# Patient Record
Sex: MALE | Race: BLACK OR AFRICAN AMERICAN | HISPANIC OR LATINO | URBAN - METROPOLITAN AREA
[De-identification: names, ages, dates, MRNs, and addresses within clinical notes are randomized per-mention and may not be internally consistent; named-entity substitution may affect disease eponyms.]

---

## 2024-06-17 ENCOUNTER — EMERGENCY (EMERGENCY)
Facility: HOSPITAL | Age: 24
LOS: 0 days | Discharge: HOME | End: 2024-06-17
Attending: EMERGENCY MEDICINE
Payer: COMMERCIAL

## 2024-06-17 VITALS
DIASTOLIC BLOOD PRESSURE: 53 MMHG | SYSTOLIC BLOOD PRESSURE: 101 MMHG | OXYGEN SATURATION: 100 % | HEART RATE: 78 BPM | RESPIRATION RATE: 17 BRPM | TEMPERATURE: 98 F | WEIGHT: 265 LBS

## 2024-06-17 VITALS
HEART RATE: 85 BPM | SYSTOLIC BLOOD PRESSURE: 111 MMHG | OXYGEN SATURATION: 99 % | TEMPERATURE: 98 F | DIASTOLIC BLOOD PRESSURE: 72 MMHG | RESPIRATION RATE: 18 BRPM

## 2024-06-17 DIAGNOSIS — S09.90XA UNSPECIFIED INJURY OF HEAD, INITIAL ENCOUNTER: ICD-10-CM

## 2024-06-17 DIAGNOSIS — S05.8X2A OTHER INJURIES OF LEFT EYE AND ORBIT, INITIAL ENCOUNTER: ICD-10-CM

## 2024-06-17 DIAGNOSIS — Z91.013 ALLERGY TO SEAFOOD: ICD-10-CM

## 2024-06-17 DIAGNOSIS — Z23 ENCOUNTER FOR IMMUNIZATION: ICD-10-CM

## 2024-06-17 DIAGNOSIS — S02.32XA FRACTURE OF ORBITAL FLOOR, LEFT SIDE, INITIAL ENCOUNTER FOR CLOSED FRACTURE: ICD-10-CM
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
## 2024-06-17 DIAGNOSIS — R51.9 HEADACHE, UNSPECIFIED: ICD-10-CM
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
## 2024-06-17 DIAGNOSIS — S01.112A LACERATION WITHOUT FOREIGN BODY OF LEFT EYELID AND PERIOCULAR AREA, INITIAL ENCOUNTER: ICD-10-CM

## 2024-06-17 LAB
ALBUMIN SERPL ELPH-MCNC: 4.6 G/DL — SIGNIFICANT CHANGE UP (ref 3.5–5.2)
ALP SERPL-CCNC: 59 U/L — SIGNIFICANT CHANGE UP (ref 30–115)
ALT FLD-CCNC: 30 U/L — SIGNIFICANT CHANGE UP (ref 0–41)
ANION GAP SERPL CALC-SCNC: 12 MMOL/L — SIGNIFICANT CHANGE UP (ref 7–14)
APTT BLD: 28.9 SEC — SIGNIFICANT CHANGE UP (ref 27–39.2)
AST SERPL-CCNC: 39 U/L — SIGNIFICANT CHANGE UP (ref 0–41)
BASOPHILS # BLD AUTO: 0.05 K/UL — SIGNIFICANT CHANGE UP (ref 0–0.2)
BASOPHILS NFR BLD AUTO: 0.5 % — SIGNIFICANT CHANGE UP (ref 0–1)
BILIRUB SERPL-MCNC: 0.4 MG/DL — SIGNIFICANT CHANGE UP (ref 0.2–1.2)
BLD GP AB SCN SERPL QL: SIGNIFICANT CHANGE UP
BUN SERPL-MCNC: 8 MG/DL — LOW (ref 10–20)
CALCIUM SERPL-MCNC: 9.2 MG/DL — SIGNIFICANT CHANGE UP (ref 8.4–10.5)
CHLORIDE SERPL-SCNC: 104 MMOL/L — SIGNIFICANT CHANGE UP (ref 98–110)
CO2 SERPL-SCNC: 23 MMOL/L — SIGNIFICANT CHANGE UP (ref 17–32)
CREAT SERPL-MCNC: 0.8 MG/DL — SIGNIFICANT CHANGE UP (ref 0.7–1.5)
EGFR: 128 ML/MIN/1.73M2 — SIGNIFICANT CHANGE UP
EOSINOPHIL # BLD AUTO: 0.02 K/UL — SIGNIFICANT CHANGE UP (ref 0–0.7)
EOSINOPHIL NFR BLD AUTO: 0.2 % — SIGNIFICANT CHANGE UP (ref 0–8)
ETHANOL SERPL-MCNC: 231 MG/DL — HIGH
GLUCOSE SERPL-MCNC: 127 MG/DL — HIGH (ref 70–99)
HCT VFR BLD CALC: 47.4 % — SIGNIFICANT CHANGE UP (ref 42–52)
HGB BLD-MCNC: 16.2 G/DL — SIGNIFICANT CHANGE UP (ref 14–18)
IMM GRANULOCYTES NFR BLD AUTO: 0.4 % — HIGH (ref 0.1–0.3)
INR BLD: 1.11 RATIO — SIGNIFICANT CHANGE UP (ref 0.65–1.3)
LACTATE SERPL-SCNC: 2 MMOL/L — SIGNIFICANT CHANGE UP (ref 0.7–2)
LIDOCAIN IGE QN: 21 U/L — SIGNIFICANT CHANGE UP (ref 7–60)
LYMPHOCYTES # BLD AUTO: 2.32 K/UL — SIGNIFICANT CHANGE UP (ref 1.2–3.4)
LYMPHOCYTES # BLD AUTO: 24.6 % — SIGNIFICANT CHANGE UP (ref 20.5–51.1)
MCHC RBC-ENTMCNC: 30.7 PG — SIGNIFICANT CHANGE UP (ref 27–31)
MCHC RBC-ENTMCNC: 34.2 G/DL — SIGNIFICANT CHANGE UP (ref 32–37)
MCV RBC AUTO: 89.8 FL — SIGNIFICANT CHANGE UP (ref 80–94)
MONOCYTES # BLD AUTO: 0.67 K/UL — HIGH (ref 0.1–0.6)
MONOCYTES NFR BLD AUTO: 7.1 % — SIGNIFICANT CHANGE UP (ref 1.7–9.3)
NEUTROPHILS # BLD AUTO: 6.35 K/UL — SIGNIFICANT CHANGE UP (ref 1.4–6.5)
NEUTROPHILS NFR BLD AUTO: 67.2 % — SIGNIFICANT CHANGE UP (ref 42.2–75.2)
NRBC # BLD: 0 /100 WBCS — SIGNIFICANT CHANGE UP (ref 0–0)
PLATELET # BLD AUTO: 286 K/UL — SIGNIFICANT CHANGE UP (ref 130–400)
PMV BLD: 11.4 FL — HIGH (ref 7.4–10.4)
POTASSIUM SERPL-MCNC: 5.7 MMOL/L — HIGH (ref 3.5–5)
POTASSIUM SERPL-SCNC: 5.7 MMOL/L — HIGH (ref 3.5–5)
PROT SERPL-MCNC: 8.1 G/DL — HIGH (ref 6–8)
PROTHROM AB SERPL-ACNC: 12.7 SEC — SIGNIFICANT CHANGE UP (ref 9.95–12.87)
RBC # BLD: 5.28 M/UL — SIGNIFICANT CHANGE UP (ref 4.7–6.1)
RBC # FLD: 11.9 % — SIGNIFICANT CHANGE UP (ref 11.5–14.5)
SODIUM SERPL-SCNC: 139 MMOL/L — SIGNIFICANT CHANGE UP (ref 135–146)
WBC # BLD: 9.45 K/UL — SIGNIFICANT CHANGE UP (ref 4.8–10.8)
WBC # FLD AUTO: 9.45 K/UL — SIGNIFICANT CHANGE UP (ref 4.8–10.8)

## 2024-06-17 PROCEDURE — 71045 X-RAY EXAM CHEST 1 VIEW: CPT | Mod: 26

## 2024-06-17 PROCEDURE — 70486 CT MAXILLOFACIAL W/O DYE: CPT | Mod: 26,MC

## 2024-06-17 PROCEDURE — 71250 CT THORAX DX C-: CPT | Mod: 26,MC

## 2024-06-17 PROCEDURE — 99283 EMERGENCY DEPT VISIT LOW MDM: CPT

## 2024-06-17 PROCEDURE — 93005 ELECTROCARDIOGRAM TRACING: CPT

## 2024-06-17 PROCEDURE — 82962 GLUCOSE BLOOD TEST: CPT

## 2024-06-17 PROCEDURE — 96375 TX/PRO/DX INJ NEW DRUG ADDON: CPT

## 2024-06-17 PROCEDURE — 99285 EMERGENCY DEPT VISIT HI MDM: CPT

## 2024-06-17 PROCEDURE — 74176 CT ABD & PELVIS W/O CONTRAST: CPT | Mod: 26,MC

## 2024-06-17 PROCEDURE — 70450 CT HEAD/BRAIN W/O DYE: CPT | Mod: 26,MC

## 2024-06-17 PROCEDURE — 72125 CT NECK SPINE W/O DYE: CPT | Mod: 26,MC

## 2024-06-17 PROCEDURE — 99285 EMERGENCY DEPT VISIT HI MDM: CPT | Mod: 25

## 2024-06-17 PROCEDURE — 90471 IMMUNIZATION ADMIN: CPT

## 2024-06-17 PROCEDURE — 93010 ELECTROCARDIOGRAM REPORT: CPT

## 2024-06-17 PROCEDURE — 99284 EMERGENCY DEPT VISIT MOD MDM: CPT | Mod: 24,25

## 2024-06-17 PROCEDURE — 72170 X-RAY EXAM OF PELVIS: CPT | Mod: 26

## 2024-06-17 PROCEDURE — 96374 THER/PROPH/DIAG INJ IV PUSH: CPT

## 2024-06-17 RX ORDER — FLUORESCEIN SODIUM 9 MG
1 STRIP OPHTHALMIC (EYE) ONCE
Refills: 0 | Status: COMPLETED | OUTPATIENT
Start: 2024-06-17 | End: 2024-06-17

## 2024-06-17 RX ORDER — ONDANSETRON 8 MG/1
4 TABLET, FILM COATED ORAL ONCE
Refills: 0 | Status: COMPLETED | OUTPATIENT
Start: 2024-06-17 | End: 2024-06-17

## 2024-06-17 RX ORDER — TETANUS TOXOID, REDUCED DIPHTHERIA TOXOID AND ACELLULAR PERTUSSIS VACCINE, ADSORBED 5; 2.5; 8; 8; 2.5 [IU]/.5ML; [IU]/.5ML; UG/.5ML; UG/.5ML; UG/.5ML
0.5 SUSPENSION INTRAMUSCULAR ONCE
Refills: 0 | Status: COMPLETED | OUTPATIENT
Start: 2024-06-17 | End: 2024-06-17

## 2024-06-17 RX ORDER — AMPICILLIN SODIUM AND SULBACTAM SODIUM 250; 125 MG/ML; MG/ML
3 INJECTION, POWDER, FOR SUSPENSION INTRAMUSCULAR; INTRAVENOUS ONCE
Refills: 0 | Status: COMPLETED | OUTPATIENT
Start: 2024-06-17 | End: 2024-06-17

## 2024-06-17 RX ORDER — OFLOXACIN 0.3 %
1 DROPS OPHTHALMIC (EYE) ONCE
Refills: 0 | Status: COMPLETED | OUTPATIENT
Start: 2024-06-17 | End: 2024-06-17

## 2024-06-17 RX ORDER — MORPHINE SULFATE 50 MG/1
4 CAPSULE, EXTENDED RELEASE ORAL ONCE
Refills: 0 | Status: DISCONTINUED | OUTPATIENT
Start: 2024-06-17 | End: 2024-06-17

## 2024-06-17 RX ADMIN — TETANUS TOXOID, REDUCED DIPHTHERIA TOXOID AND ACELLULAR PERTUSSIS VACCINE, ADSORBED 0.5 MILLILITER(S): 5; 2.5; 8; 8; 2.5 SUSPENSION INTRAMUSCULAR at 07:55

## 2024-06-17 RX ADMIN — MORPHINE SULFATE 4 MILLIGRAM(S): 50 CAPSULE, EXTENDED RELEASE ORAL at 15:34

## 2024-06-17 RX ADMIN — ONDANSETRON 4 MILLIGRAM(S): 8 TABLET, FILM COATED ORAL at 07:55

## 2024-06-17 RX ADMIN — AMPICILLIN SODIUM AND SULBACTAM SODIUM 200 GRAM(S): 250; 125 INJECTION, POWDER, FOR SUSPENSION INTRAMUSCULAR; INTRAVENOUS at 15:34

## 2024-06-17 NOTE — ED PROVIDER NOTE - PATIENT PORTAL LINK FT
You can access the FollowMyHealth Patient Portal offered by [facility] by registering at the following website: [email or web address]. By joining [facility]’s FollowMyHealth portal, you will also be able to view your health information using other applications (apps) compatible with our system.

## 2024-06-17 NOTE — ED ADULT NURSE REASSESSMENT NOTE - NS ED NURSE REASSESS COMMENT FT1
Pt received as [place] transfer, pt in C-collar w/ 18g in place to R AC. Pt sleeping but easily arousable. Trauma alert called- see flowsheet
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
## 2024-06-17 NOTE — ED PROVIDER NOTE - PHYSICAL EXAMINATION
VITAL SIGNS: I have reviewed nursing notes and confirm.  CONSTITUTIONAL: 22 yo M laying on stretcher in NAD; (+) AOB  SKIN: Skin exam is warm and dry, no acute rash.  HEAD: Normocephalic; (+) L frontal abrasion, L maxillary abrasion  EYES: PERRL, EOM intact; conjunctiva and sclera clear. (+) L periorbital ecchymosis, skin tear to L upper eyelid, 0.5 cm laceration to L upper eyelid.  ENT: No nasal discharge; airway clear.   NECK: Supple; No midline TTP.   CARD: S1, S2 normal; no murmurs, gallops, or rubs. Regular rate and rhythm.  RESP: No wheezes, rales or rhonchi.   ABD: Normal bowel sounds; soft; non-distended; non-tender; No rebound or guarding. No CVA tenderness.  BACK: No midline TTP.   EXT: No hip TTP or deformity. Radial and DP 2+.   NEURO: Alert, moving all extremities, no focal deficits.
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
## 2024-06-17 NOTE — ED ADULT NURSE REASSESSMENT NOTE - NS ED NURSE REASSESS COMMENT FT1
RN attempted to ambulate. Pt ambulatory w steady gait by self. Spouse at bedside encouraging patient

## 2024-06-17 NOTE — ED ADULT NURSE REASSESSMENT NOTE - NS ED NURSE REASSESS COMMENT FT1
pt brought to ct on cardiac monitor, while in ct scan pt began projectile vomiting blood. concern for airway management, rapid response activated. pt remained stable through episode and brought back to ED with ED staff at bedside.
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
## 2024-06-17 NOTE — ED PROVIDER NOTE - NSFOLLOWUPINSTRUCTIONS_ED_ALL_ED_FT
Our Emergency Department Referral Coordinators will be reaching out to you in the next 24-48 hours from 9:00am to 5:00pm with a follow up appointment. Please expect a phone call from the hospital in that time frame. If you do not receive a call or if you have any questions or concerns, you can reach them at   [phone]    Please followup with Ophthalmology and OMFS    Orbital Fracture  Front, or anterior, view of the skull inside a person's head showing the orbital roof and orbital floor.  An orbital fracture is a break in the orbit or eye socket, which is the bony structure that protects the eye. This fracture usually causes swelling and pain, and it may affect vision.    There are three main types of orbital fracture:  Orbital roof fracture. This is a break at the top part of the orbit.  Orbital rim fracture. This is a break at the outer edge of the orbit.  Orbital floor fracture. This is a break at the bottom part of the orbit.  An orbital floor fracture with entrapment is when muscle or tissue, or both, get trapped inside of an orbital fracture. Vision is often affected. Orbital floor fracture with entrapment may also be called a trapdoor fracture. This fracture is more commonly seen in children and may cause a nervous system reaction that requires treatment right away because of irregular pulse rates and blood pressure levels.    What are the causes?  This condition is caused by an injury to your eye, such as a hard, direct hit. Causes include:  Sports injuries, such as a hard compact ball or bat striking the eye.  Falls during running or bicycling.  Assaults or combat sports where trauma to the face occurs.  Motor vehicle accidents in which the face hits the dashboard or another hard object inside the vehicle.  What are the signs or symptoms?  Two eyes. One eye is normal. The other eye has swelling and bruising around it.  Symptoms of this condition include:  Swelling and bruising around your eye.  Pain or tenderness around your eye.  Difficulty looking up, down, or side to side.  Changes in vision, such as blurry vision or double vision.  The injured eye looking sunken compared with the other eye (enophthalmos).  The injured eye bulging (exophthalmos), in severe cases of swelling.  Inability to gaze upwards with the affected eye.  Numbness of the cheek, inner nose, and upper gum on the side of the face that has the injury.  How is this diagnosed?  This condition may be diagnosed based on:  Your symptoms and medical history, especially any history of trauma.  An eye exam. This involves checking visual clarity (acuity), feeling the orbital area with the hand, and checking other structures of the face.  An X-ray or a CT scan.  How is this treated?  Treatment for this condition depends on the severity and type of fracture.    For small fractures, surgery may not be needed. The broken bone heals on its own with time. To help manage symptoms, treatment may include:  Applying ice to the injured area.  Pain medicines.  Antibiotic medicine to treat or prevent infection.  Steroids to reduce swelling.  Scheduling a follow-up visit with an eye specialist (ophthalmologist).  If there is entrapment, treatment is usually started after all swelling around the eye has gone away, which may take 1–2 weeks. After swelling goes away, an ophthalmologist may try to free the entrapped tissue if you still have double vision. If this cannot be done, you may need surgery.    If you have double vision only when looking up, your health care provider will discuss treatment options with you. Some people who do not spend a lot of time looking up choose to go without more treatment. Others who need to look up often for work need treatment.    If you have any of these symptoms, you may need emergency surgery:  Nausea or vomiting.  A slow heart rate.  Dizziness.  Loss of consciousness.  Severe pain or worsening bulging of the eye.  Complete loss of vision in the injured eye. This may indicate optic nerve damage.  Follow these instructions at home:  Medicines    Take over-the-counter and prescription medicines only as told by your health care provider.  If you were prescribed an antibiotic medicine, take it as told by your health care provider. Do not stop taking the antibiotic even if you start to feel better.  Managing pain, bruising, and swelling    A bag of ice on a towel on the skin.   If directed, put ice on the injured eye. To do this:  Put ice in a plastic bag.  Place a towel between your skin and the bag.  Leave the ice on for 20 minutes, 2–3 times a day.  Remove the ice if your skin turns bright red. This is very important. If you cannot feel pain, heat, or cold, you have a greater risk of damage to the area.  If recommended by your health care provider, put one or two extra pillows under your head when you are lying down.  Activity    Do not drive or operate machinery until your health care provider says that it is safe. Be aware that if you are using only one eye to see, you may have trouble judging distances (depth perception). Ask your health care provider when it is safe to drive.  Avoid traveling by plane or going to high-altitude areas. This may slow the healing of your swelling and may increase sinus pain.  Do not lift anything that is heavier than 10 lb (4.5 kg), or the limit that you are told, until your health care provider says that it is safe.  Return to your normal activities as told by your health care provider. Ask your health care provider what activities are safe for you.  Follow instructions from your health care provider about wearing protective glasses or goggles for work.  General instructions    Do not touch, rub, or try to move your eye.  Do not blow your nose until your health care provider says that you can.  Do not put a contact lens in the injured eye until your health care provider says that you can.  Stay away from khloe areas.  Do not use any products that contain nicotine or tobacco. These products include cigarettes, chewing tobacco, and vaping devices, such as e-cigarettes. If you need help quitting, ask your health care provider.  Do not take baths, swim, or use a hot tub until your health care provider approves. Ask your health care provider if you may take showers. You may only be allowed to take sponge baths.  Keep all follow-up visits. This is important.  Contact a health care provider if:  You have vision changes, such as increased blurriness or worsening double vision.  You feel pain when you move your eyes.  You feel nauseous or light-headed when you move your eyes.  You have redness or swelling that does not go away or gets worse.  You have blood or fluid coming from your nose.  You have a fever or a headache.  Get help right away if:  You have a sensation of seeing flashing lights.  You have sudden blindness.  You have sudden bulging of the injured eye.  Your heart is beating much more slowly than normal.  You feel dizzy or you faint.  You have chest pain.  You are short of breath.  These symptoms may represent a serious problem that is an emergency. Do not wait to see if the symptoms will go away. Get medical help right away. Call your local emergency services (911 in the [place] Do not drive yourself to the hospital.    Summary  An orbital fracture is a break in the orbit or eye socket, which is the bony structure that protects the eye.  This condition usually causes swelling and pain around the injured eye, and you may have bruising or vision loss. You may also have trouble looking up, down, or side to side.  Treatment depends on the severity and type of fracture and if there are any serious vision problems. Often, the only treatment is waiting until the swelling goes down. More serious symptoms may indicate the need for emergency surgery.  You should notdrive until your health care provider says that it is safe. Return to your normal activities as told by your health care provider. It is important to keep all follow-up visits.  This information is not intended to replace advice given to you by your health care provider. Make sure you discuss any questions you have with your health care provider.
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
## 2024-06-17 NOTE — CONSULT NOTE ADULT - ASSESSMENT
ASSESSMENT  1) Left Orbital Blowout Fracture   -- no evidence of entrapment   2) Commotio Retinae OS  3) Lid lacerations/abrasions OS    RECOMMENDATIONS  -- Treat lacerations as directed by Plastics team (Abx wale)  -- No nose blowing x 1 week, pt should sneeze with mouth open  -- Continue icing of affected area x 20 min q1-2 hours   -- Sleep with head elevated ~30 degrees  -- Pt should follow up as outpatient at [facility] ([phone]) or Eye Care Provider of pt's choice within 2-4 weeks AFTER Plastics appointment

## 2024-06-17 NOTE — ED ADULT NURSE NOTE - NS TRANSFER PATIENT BELONGINGS
sneakers, jewelry and clothing given to wife, paulie, in pt belonging bag/Jewelry/Money (specify)/Clothing
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
## 2024-06-17 NOTE — ED PROVIDER NOTE - STUDIES
Xray Image(s) - see wet read section for interpretation/CT Scan images EKG - see results section for interpretation/Xray Image(s) - see wet read section for interpretation/CT Scan images

## 2024-06-17 NOTE — ED ADULT NURSE NOTE - NSFALLRISKINTERV_ED_ALL_ED
Assistance OOB with selected safe patient handling equipment if applicable/Assistance with ambulation/Communicate fall risk and risk factors to all staff, patient, and family/Monitor gait and stability/Monitor for mental status changes and reorient to person, place, and time, as needed/Move patient closer to nursing station/within visual sight of ED staff/Provide visual cue: yellow wristband, yellow gown, etc/Reinforce activity limits and safety measures with patient and family/Toileting schedule using arm’s reach rule for commode and bathroom/Use of alarms - bed, stretcher, chair and/or video monitoring/Call bell, personal items and telephone in reach/Instruct patient to call for assistance before getting out of bed/chair/stretcher/Non-slip footwear applied when patient is off stretcher/[place] to call system/Physically safe environment - no spills, clutter or unnecessary equipment/Purposeful Proactive Rounding/Room/bathroom lighting operational, light cord in reach

## 2024-06-17 NOTE — ED PROVIDER NOTE - DIFFERENTIAL DIAGNOSIS
Intracranial bleeding, cervical spine injury, intrathoracic trauma, intra-abdominal trauma. Differential Diagnosis
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
